# Patient Record
Sex: MALE | Race: OTHER | Employment: FULL TIME | URBAN - NONMETROPOLITAN AREA
[De-identification: names, ages, dates, MRNs, and addresses within clinical notes are randomized per-mention and may not be internally consistent; named-entity substitution may affect disease eponyms.]

---

## 2022-07-08 ENCOUNTER — OFFICE VISIT (OUTPATIENT)
Dept: PRIMARY CARE CLINIC | Age: 27
End: 2022-07-08

## 2022-07-08 VITALS — HEIGHT: 73 IN | WEIGHT: 220 LBS | BODY MASS INDEX: 29.16 KG/M2
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
## 2022-07-08 DIAGNOSIS — Z91.89 HIGH-RISK EXPOSURE TO INFECTIOUS PATIENT: ICD-10-CM

## 2022-07-08 DIAGNOSIS — Z20.9 HIGH-RISK EXPOSURE TO INFECTIOUS PATIENT: ICD-10-CM

## 2022-07-08 DIAGNOSIS — T14.90XA OCCUPATIONAL INJURY: ICD-10-CM

## 2022-07-08 DIAGNOSIS — Z77.21 ACCIDENTAL HYPODERMIC NEEDLESTICK INJURY WITH EXPOSURE TO BODY FLUID: Primary | ICD-10-CM

## 2022-07-08 LAB
ALBUMIN SERPL-MCNC: 5.2 G/DL (ref 3.5–5.2)
ALP BLD-CCNC: 59 U/L (ref 40–130)
ALT SERPL-CCNC: 37 U/L (ref 5–41)
ANION GAP SERPL CALCULATED.3IONS-SCNC: 12 MMOL/L (ref 7–19)
AST SERPL-CCNC: 30 U/L (ref 5–40)
BILIRUB SERPL-MCNC: 0.6 MG/DL (ref 0.2–1.2)
BUN BLDV-MCNC: 12 MG/DL (ref 6–20)
CALCIUM SERPL-MCNC: 9.7 MG/DL (ref 8.6–10)
CHLORIDE BLD-SCNC: 99 MMOL/L (ref 98–111)
CO2: 27 MMOL/L (ref 22–29)
CREAT SERPL-MCNC: 0.9 MG/DL (ref 0.5–1.2)
GFR AFRICAN AMERICAN: >59
GFR NON-AFRICAN AMERICAN: >60
GLUCOSE BLD-MCNC: 88 MG/DL (ref 74–109)
HEPATITIS B SURFACE ANTIGEN INTERPRETATION: NORMAL
HEPATITIS C ANTIBODY INTERPRETATION: NORMAL
HIV-1 P24 AG: NORMAL
POTASSIUM SERPL-SCNC: 4.3 MMOL/L (ref 3.5–5)
RAPID HIV 1&2: NORMAL
SODIUM BLD-SCNC: 138 MMOL/L (ref 136–145)
TOTAL PROTEIN: 8 G/DL (ref 6.6–8.7)

## 2022-07-08 ASSESSMENT — PATIENT HEALTH QUESTIONNAIRE - PHQ9
SUM OF ALL RESPONSES TO PHQ QUESTIONS 1-9: 0
SUM OF ALL RESPONSES TO PHQ9 QUESTIONS 1 & 2: 0
1. LITTLE INTEREST OR PLEASURE IN DOING THINGS: 0
2. FEELING DOWN, DEPRESSED OR HOPELESS: 0
SUM OF ALL RESPONSES TO PHQ QUESTIONS 1-9: 0

## 2022-07-08 NOTE — PROGRESS NOTES
[facility] PHYSICIAN SERVICES  [facility]  [address]  [address] [facility] [place] [address]  Dept: [phone]  Dept Fax: [phone]  Loc: [phone]    Clay Zacarias is a 33 y.o. male who presents today for his medical conditions/complaints as noted below. 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 He is recently had a couple of needle sticks after injecting someone he did have gloves on but was told he needed to have lab work done. He did report it to his superiors. He would also like to have all STD testing while he is here today. History reviewed. No pertinent past medical history. History reviewed. No pertinent surgical history. Vitals 7/8/2022   Weight 220 lb   Height 6' 1\"   Body mass index 29.02 kg/m2       Family History   Problem Relation Age of Onset    Cancer Mother 48        cervical    High Cholesterol Mother     Diabetes Mother     High Cholesterol Father     High Cholesterol Brother     High Cholesterol Brother     High Cholesterol Brother     Coronary Art Dis Maternal Grandfather        Social History     Tobacco Use    Smoking status: Every Day     Types: Cigarettes, Cigars    Smokeless tobacco: Never   Substance Use Topics    Alcohol use: Yes     Alcohol/week: 15.0 standard drinks     Types: 2 Glasses of wine, 13 Shots of liquor per week      No current outpatient medications on file prior to visit. No current facility-administered medications on file prior to visit.      No Known Allergies    Health Maintenance   Topic Date Due    COVID-19 Vaccine (1) Never done    Varicella vaccine (1 of 2 - 2-dose childhood series) Never done    Pneumococcal 0-64 years Vaccine (1 - PCV) Never done    HPV vaccine (1 - Male 2-dose series) Never done    DTaP/Tdap/Td vaccine (1 - Tdap) Never done    Flu vaccine (1) 09/01/2022    Depression Screen  07/08/2023    Hepatitis C screen  Completed    HIV screen  Completed    Hepatitis A vaccine  Aged Out    Hepatitis B vaccine  Aged Out    Hib vaccine  Aged Out    Meningococcal (ACWY) vaccine  Aged Out       Subjective:      Review of Systems   Constitutional: Negative. HENT: Negative. Eyes: Negative. Respiratory: Negative. Cardiovascular: Negative. Gastrointestinal: Negative. Endocrine: Negative. Genitourinary: Negative. Musculoskeletal: Negative. Allergic/Immunologic: Negative. Neurological: Negative. Hematological: Negative. Psychiatric/Behavioral: Negative. Objective:     Physical Exam  Vitals and nursing note reviewed. Constitutional:       Appearance: Normal appearance. He is well-developed. HENT:      Head: Normocephalic. Right Ear: Tympanic membrane and external ear normal.      Left Ear: Tympanic membrane and external ear normal.      Nose: Nose normal.      Mouth/Throat:      Mouth: Mucous membranes are moist.   Eyes:      Pupils: Pupils are equal, round, and reactive to light. Neck:      Vascular: No carotid bruit. Cardiovascular:      Rate and Rhythm: Normal rate and regular rhythm. Heart sounds: Normal heart sounds. Pulmonary:      Effort: Pulmonary effort is normal.      Breath sounds: Normal breath sounds. Abdominal:      General: Abdomen is flat. Bowel sounds are normal.      Palpations: Abdomen is soft. Genitourinary:     Comments: Declined gu exam  Musculoskeletal:         General: Normal range of motion. Cervical back: Normal range of motion. Skin:     General: Skin is warm and dry. Capillary Refill: Capillary refill takes less than 2 seconds. Neurological:      General: No focal deficit present. Mental Status: He is alert and oriented to person, place, and time.    Psychiatric:         Mood and Affect: Mood normal.         Behavior: Behavior normal.         Thought Content: Thought content normal.         Judgment: Judgment normal.     Ht 6' 1\" (1.854 m)   Wt 220 lb (99.8 kg)   BMI 29.03 kg/m²     Assessment:       Diagnosis Orders   1. Accidental hypodermic needlestick injury with exposure to body fluid  HIV Rapid 1&2    Hepatitis C Antibody    Hepatitis B Surface Antigen    Chlamydia/N. Gonorrhoeae/T. Vaginalis    Comprehensive Metabolic Panel    RPR      2. Occupational injury  HIV Rapid 1&2    Hepatitis C Antibody    Hepatitis B Surface Antigen    Chlamydia/N. Gonorrhoeae/T. Vaginalis    Comprehensive Metabolic Panel    RPR      3. High-risk exposure to infectious patient  RPR            Plan:   More than 50% of the time was spent counseling and coordinating care for a total time of 30min face to face. We did discuss safe sex practices today and I will provide him with a copy of these results in case he needs them for work. He is not currently on any medications he is a healthy 62-year-old  PDMP Monitoring:    Last PDMP [employer] as Reviewed [facility]):  Review User Review Instant Review Result            Urine Drug Screenings (1 yr)    No resulted procedures found. Medication Contract and Consent for Opioid Use Documents Filed        No documents found                     Patient given educational materials -see patient instructions. Discussed use, benefit, and side effects of prescribed medications. All patient questions answered. Pt voiced understanding. Reviewed health maintenance. Instructed to continue currentmedications, diet and exercise. Patient agreed with treatment plan. Follow up as directed. MEDICATIONS:  No orders of the defined types were placed in this encounter. ORDERS:  Orders Placed This Encounter   Procedures    Chlamydia/N. Gonorrhoeae/T. Vaginalis    HIV Rapid 1&2    Hepatitis C Antibody    Hepatitis B Surface Antigen    Comprehensive Metabolic Panel    RPR       Follow-up:  No follow-ups on file.     PATIENT INSTRUCTIONS:  There are no Patient Instructions on file for this visit. Electronically signed by NANCY Padilla CNP on 7/20/2022 at 9:08 AM    EMR Dragon/transcription disclaimer:  Much of thisencounter note is electronic transcription/translation of spoken language to printed texts. The electronic translation of spoken language may be erroneous, or at times, nonsensical words or phrases may be inadvertentlytranscribed.   Although I have reviewed the note for such errors, some may still exist.

## 2022-07-09 LAB
C. TRACHOMATIS DNA ,URINE: NOT DETECTED
N. GONORRHOEAE DNA, URINE: NOT DETECTED
TRICHOMONAS VAGINALIS DNA, URINE: NOT DETECTED

## 2022-07-11 LAB — RPR: NORMAL

## 2022-07-20 ASSESSMENT — ENCOUNTER SYMPTOMS
RESPIRATORY NEGATIVE: 1
GASTROINTESTINAL NEGATIVE: 1
EYES NEGATIVE: 1
ALLERGIC/IMMUNOLOGIC NEGATIVE: 1